# Patient Record
Sex: FEMALE | Race: WHITE | NOT HISPANIC OR LATINO | Employment: UNEMPLOYED | URBAN - METROPOLITAN AREA
[De-identification: names, ages, dates, MRNs, and addresses within clinical notes are randomized per-mention and may not be internally consistent; named-entity substitution may affect disease eponyms.]

---

## 2018-08-03 PROBLEM — M54.2 CERVICALGIA: Status: ACTIVE | Noted: 2018-08-03

## 2020-11-04 ENCOUNTER — HOSPITAL ENCOUNTER (OUTPATIENT)
Dept: LAB | Facility: MEDICAL CENTER | Age: 45
End: 2020-11-04
Attending: FAMILY MEDICINE
Payer: COMMERCIAL

## 2020-11-04 LAB — COVID ORDER STATUS COVID19: NORMAL

## 2020-11-04 PROCEDURE — C9803 HOPD COVID-19 SPEC COLLECT: HCPCS

## 2020-11-04 PROCEDURE — U0003 INFECTIOUS AGENT DETECTION BY NUCLEIC ACID (DNA OR RNA); SEVERE ACUTE RESPIRATORY SYNDROME CORONAVIRUS 2 (SARS-COV-2) (CORONAVIRUS DISEASE [COVID-19]), AMPLIFIED PROBE TECHNIQUE, MAKING USE OF HIGH THROUGHPUT TECHNOLOGIES AS DESCRIBED BY CMS-2020-01-R: HCPCS

## 2020-11-05 LAB
SARS-COV-2 RNA RESP QL NAA+PROBE: NOTDETECTED
SPECIMEN SOURCE: NORMAL

## 2021-09-06 ENCOUNTER — HOSPITAL ENCOUNTER (OUTPATIENT)
Facility: MEDICAL CENTER | Age: 46
End: 2021-09-06
Attending: NURSE PRACTITIONER
Payer: COMMERCIAL

## 2021-09-06 PROCEDURE — U0005 INFEC AGEN DETEC AMPLI PROBE: HCPCS

## 2021-09-06 PROCEDURE — U0003 INFECTIOUS AGENT DETECTION BY NUCLEIC ACID (DNA OR RNA); SEVERE ACUTE RESPIRATORY SYNDROME CORONAVIRUS 2 (SARS-COV-2) (CORONAVIRUS DISEASE [COVID-19]), AMPLIFIED PROBE TECHNIQUE, MAKING USE OF HIGH THROUGHPUT TECHNOLOGIES AS DESCRIBED BY CMS-2020-01-R: HCPCS

## 2021-09-07 LAB
COVID ORDER STATUS COVID19: NORMAL
SARS-COV-2 RNA RESP QL NAA+PROBE: NOTDETECTED
SPECIMEN SOURCE: NORMAL

## 2021-12-31 ENCOUNTER — OFFICE VISIT (OUTPATIENT)
Dept: URGENT CARE | Facility: CLINIC | Age: 46
End: 2021-12-31
Payer: COMMERCIAL

## 2021-12-31 VITALS
BODY MASS INDEX: 20.99 KG/M2 | SYSTOLIC BLOOD PRESSURE: 88 MMHG | HEIGHT: 65 IN | WEIGHT: 126 LBS | HEART RATE: 86 BPM | DIASTOLIC BLOOD PRESSURE: 58 MMHG | TEMPERATURE: 98.5 F | OXYGEN SATURATION: 96 %

## 2021-12-31 DIAGNOSIS — J34.89 SINUS PRESSURE: ICD-10-CM

## 2021-12-31 DIAGNOSIS — R09.82 POST-NASAL DRIP: ICD-10-CM

## 2021-12-31 DIAGNOSIS — H93.8X3 SENSATION OF PLUGGED EAR ON BOTH SIDES: ICD-10-CM

## 2021-12-31 PROBLEM — J30.2 SEASONAL ALLERGIES: Status: ACTIVE | Noted: 2017-10-02

## 2021-12-31 PROBLEM — K58.9 IRRITABLE BOWEL SYNDROME: Status: ACTIVE | Noted: 2017-08-09

## 2021-12-31 PROBLEM — Z78.0 MENOPAUSE: Status: ACTIVE | Noted: 2019-01-16

## 2021-12-31 PROBLEM — M54.9 BACKACHE: Status: ACTIVE | Noted: 2021-05-07

## 2021-12-31 LAB
EXTERNAL QUALITY CONTROL: NORMAL
FLUAV+FLUBV AG SPEC QL IA: NEGATIVE
INT CON NEG: NORMAL
INT CON POS: NORMAL
SARS-COV+SARS-COV-2 AG RESP QL IA.RAPID: NEGATIVE

## 2021-12-31 PROCEDURE — 87804 INFLUENZA ASSAY W/OPTIC: CPT | Performed by: NURSE PRACTITIONER

## 2021-12-31 PROCEDURE — 99203 OFFICE O/P NEW LOW 30 MIN: CPT | Mod: CS | Performed by: NURSE PRACTITIONER

## 2021-12-31 PROCEDURE — 87426 SARSCOV CORONAVIRUS AG IA: CPT | Performed by: NURSE PRACTITIONER

## 2021-12-31 RX ORDER — THYROID 120 MG/1
15 TABLET ORAL DAILY
COMMUNITY

## 2021-12-31 RX ORDER — TRIAMTERENE AND HYDROCHLOROTHIAZIDE 37.5; 25 MG/1; MG/1
TABLET ORAL
COMMUNITY

## 2021-12-31 RX ORDER — FLUVOXAMINE MALEATE 50 MG/1
TABLET, COATED ORAL
COMMUNITY

## 2021-12-31 RX ORDER — AMOXICILLIN AND CLAVULANATE POTASSIUM 875; 125 MG/1; MG/1
1 TABLET, FILM COATED ORAL 2 TIMES DAILY
Qty: 14 TABLET | Refills: 0 | Status: SHIPPED | OUTPATIENT
Start: 2021-12-31 | End: 2022-01-07

## 2021-12-31 RX ORDER — VORTIOXETINE 5 MG/1
1 TABLET, FILM COATED ORAL DAILY
COMMUNITY
End: 2022-09-21

## 2021-12-31 ASSESSMENT — ENCOUNTER SYMPTOMS: COUGH: 0

## 2021-12-31 NOTE — PROGRESS NOTES
Subjective     Brenda Marie Campagni-Milligan is a 46 y.o. female who presents with Sore Throat (x 4 days, post nasal drip, sore throat, sinus pressure, plugge ear)            Pharyngitis   This is a new problem. Episode onset: pt reports new onset of ST, sinus pressure and plugged ear sensation that started aout 4 days ago. She has had sick family contacts, all have recently tested negative for COVID. Neither side of throat is experiencing more pain than the other. Associated symptoms include congestion. Pertinent negatives include no coughing. Associated symptoms comments: She is not vaccinated for COVID. She has tried nothing for the symptoms.       Review of Systems   Constitutional: Positive for malaise/fatigue.   HENT: Positive for congestion.    Respiratory: Negative for cough.    All other systems reviewed and are negative.         History reviewed. No pertinent past medical history. History reviewed. No pertinent surgical history.   Social History     Socioeconomic History   • Marital status:      Spouse name: Not on file   • Number of children: Not on file   • Years of education: Not on file   • Highest education level: Not on file   Occupational History   • Not on file   Tobacco Use   • Smoking status: Never Smoker   • Smokeless tobacco: Never Used   Vaping Use   • Vaping Use: Never used   Substance and Sexual Activity   • Alcohol use: Not Currently   • Drug use: Never   • Sexual activity: Not on file   Other Topics Concern   • Not on file   Social History Narrative   • Not on file     Social Determinants of Health     Financial Resource Strain:    • Difficulty of Paying Living Expenses: Not on file   Food Insecurity:    • Worried About Running Out of Food in the Last Year: Not on file   • Ran Out of Food in the Last Year: Not on file   Transportation Needs:    • Lack of Transportation (Medical): Not on file   • Lack of Transportation (Non-Medical): Not on file   Physical Activity:    • Days of  "Exercise per Week: Not on file   • Minutes of Exercise per Session: Not on file   Stress:    • Feeling of Stress : Not on file   Social Connections:    • Frequency of Communication with Friends and Family: Not on file   • Frequency of Social Gatherings with Friends and Family: Not on file   • Attends Hoahaoism Services: Not on file   • Active Member of Clubs or Organizations: Not on file   • Attends Club or Organization Meetings: Not on file   • Marital Status: Not on file   Intimate Partner Violence:    • Fear of Current or Ex-Partner: Not on file   • Emotionally Abused: Not on file   • Physically Abused: Not on file   • Sexually Abused: Not on file   Housing Stability:    • Unable to Pay for Housing in the Last Year: Not on file   • Number of Places Lived in the Last Year: Not on file   • Unstable Housing in the Last Year: Not on file         Objective     BP (!) 88/58   Pulse 86   Temp 36.9 °C (98.5 °F) (Temporal)   Ht 1.638 m (5' 4.5\")   Wt 57.2 kg (126 lb)   SpO2 96%   Breastfeeding No   BMI 21.29 kg/m²      Physical Exam  Vitals and nursing note reviewed.   Constitutional:       Appearance: Normal appearance. She is normal weight.   HENT:      Head: Normocephalic and atraumatic.      Right Ear: Tympanic membrane and external ear normal.      Left Ear: Tympanic membrane and external ear normal.      Nose: Rhinorrhea present.      Mouth/Throat:      Mouth: Mucous membranes are moist.      Pharynx: Oropharynx is clear.   Eyes:      Extraocular Movements: Extraocular movements intact.      Pupils: Pupils are equal, round, and reactive to light.   Cardiovascular:      Rate and Rhythm: Normal rate and regular rhythm.   Pulmonary:      Effort: Pulmonary effort is normal.      Breath sounds: Normal breath sounds.   Musculoskeletal:         General: Normal range of motion.      Cervical back: Normal range of motion.   Skin:     General: Skin is warm and dry.      Capillary Refill: Capillary refill takes less " than 2 seconds.   Neurological:      General: No focal deficit present.      Mental Status: She is alert and oriented to person, place, and time. Mental status is at baseline.   Psychiatric:         Mood and Affect: Mood normal.         Speech: Speech normal.         Thought Content: Thought content normal.         Judgment: Judgment normal.                             Assessment & Plan        1. Post-nasal drip  - POCT Influenza A/B  - POCT SARS-COV Antigen NEIDA (Symptomatic Only)    2. Sinus pressure  - POCT Influenza A/B  - POCT SARS-COV Antigen NEIDA (Symptomatic Only)    3. Sensation of plugged ear on both sides  - POCT Influenza A/B  - POCT SARS-COV Antigen NEIDA (Symptomatic Only)       Rapid COVID and flu- negative, will not reflex to PCR  Contingent antibiotic prescription given to patient to fill upon meeting criteria of guidelines discussed.   OTC claritin and flonase daily  Increase water intake to help boost blood volume and blood pressure  I followed all reasonable PPE precautions during this encounter including but not limited to use of an N95 mask, gloves, and gown if indicated.    Supportive care, differential diagnoses, and indications for immediate follow-up discussed with patient.    Pathogenesis of diagnosis discussed including typical length and natural progression.      Instructed to return to  or nearest emergency department if symptoms fail to improve, for any change in condition, further concerns, or new concerning symptoms.  Patient states understanding of the plan of care and discharge instructions.

## 2022-08-14 ENCOUNTER — HOSPITAL ENCOUNTER (EMERGENCY)
Facility: MEDICAL CENTER | Age: 47
End: 2022-08-14
Attending: EMERGENCY MEDICINE
Payer: COMMERCIAL

## 2022-08-14 VITALS
BODY MASS INDEX: 22.58 KG/M2 | RESPIRATION RATE: 17 BRPM | HEART RATE: 79 BPM | TEMPERATURE: 97.9 F | DIASTOLIC BLOOD PRESSURE: 65 MMHG | HEIGHT: 64 IN | OXYGEN SATURATION: 99 % | SYSTOLIC BLOOD PRESSURE: 110 MMHG | WEIGHT: 132.28 LBS

## 2022-08-14 DIAGNOSIS — S46.912A STRAIN OF LEFT SHOULDER, INITIAL ENCOUNTER: ICD-10-CM

## 2022-08-14 PROCEDURE — 99282 EMERGENCY DEPT VISIT SF MDM: CPT

## 2022-08-14 RX ORDER — OXYCODONE HYDROCHLORIDE AND ACETAMINOPHEN 5; 325 MG/1; MG/1
1 TABLET ORAL EVERY 8 HOURS PRN
Qty: 10 TABLET | Refills: 0 | Status: SHIPPED | OUTPATIENT
Start: 2022-08-14 | End: 2022-08-17

## 2022-08-14 ASSESSMENT — LIFESTYLE VARIABLES
HAVE YOU EVER FELT YOU SHOULD CUT DOWN ON YOUR DRINKING: NO
DO YOU DRINK ALCOHOL: YES

## 2022-08-14 NOTE — ED PROVIDER NOTES
"ED Provider Note    CHIEF COMPLAINT  Chief Complaint   Patient presents with    Arm Pain     Left arm pain since Thursday, acutely increased yesterday. Extreme pain with trying to raise arm.  Started after doing a zip line tour.       HPI  Brenda M Campagni-Milligan is a 47 y.o. female who presents with shoulder pain.  The patient states that she went on a zip line to her on Tuesday she states the pain started on Thursday and has progressed.  She states she has severe pain to the lateral aspect of the left shoulder extending into the left trapezius region.  She states the pain is worse with abduction.  She does not have any pain in the elbow nor the shoulder.  She does not have any loss of function of the left upper extremity but does have severe pain with abduction as mentioned above.    REVIEW OF SYSTEMS  No recent fevers, no other musculoskeletal complaints    PHYSICAL EXAM  VITAL SIGNS: /61   Pulse 83   Temp 37.2 °C (99 °F) (Temporal)   Resp 18   Ht 1.626 m (5' 4\")   Wt 60 kg (132 lb 4.4 oz)   SpO2 98%   BMI 22.71 kg/m²   In general the patient appears uncomfortable but nontoxic    Extremities the patient has reproducible pain throughout the deltoid and trapezius of the left upper extremity.  She does have limited range of motion due to pain.  The majority the pain is when she approaches 45 to 90 degrees of abduction.  She has a normal left elbow and left wrist exam.    Skin no erythema nor induration    Neurovascular examination is grossly intact to the left upper extremity    COURSE & MEDICAL DECISION MAKING  Pertinent Labs & Imaging studies reviewed. (See chart for details)  This a 47-year-old female who presents with left shoulder pain.  I suspect this is from a soft tissue etiology.  The patient will be treated with Percocet for acute pain control as well as Motrin.  I will perform a narcotic check prior to prescribe the medication.  The patient will take medication only as directed.  She will " also take 800 mg of Motrin every 8 hours.  If she is not significantly better in 72 hours she will follow-up with [facility] orthopedics.    FINAL IMPRESSION  1.  Left shoulder strain       Disposition  The patient will be discharged in stable condition      Electronically signed by: Abhinav Vasquez M.D., 8/14/2022 9:25 AM

## 2022-08-14 NOTE — DISCHARGE INSTRUCTIONS
Take 800 mg of Motrin every 8 hours as discussed.  Follow-up with orthopedics if you are not significantly better in 4 to 5 days and return if you are acutely worse.

## 2022-08-14 NOTE — ED TRIAGE NOTES
"Chief Complaint   Patient presents with    Arm Pain     Left arm pain since Thursday, acutely increased yesterday. Extreme pain with trying to raise arm.  Started after doing a zip line tour.     ED Triage Vitals [08/14/22 0823]   Enc Vitals Group      Blood Pressure 109/61      Pulse 83      Respiration 18      Temperature 37.2 °C (99 °F)      Temp src Temporal      Pulse Oximetry 98 %      Weight 60 kg (132 lb 4.4 oz)      Height 1.626 m (5' 4\")     States she was holding herself up while on the zip line and thinks she may have injured her arm this was. Pain from bicep region extends to scapular region.  "

## 2022-08-15 ENCOUNTER — HOSPITAL ENCOUNTER (OUTPATIENT)
Dept: RADIOLOGY | Facility: MEDICAL CENTER | Age: 47
End: 2022-08-15
Attending: PHYSICIAN ASSISTANT
Payer: COMMERCIAL

## 2022-08-15 DIAGNOSIS — M25.512 LEFT SHOULDER PAIN, UNSPECIFIED CHRONICITY: ICD-10-CM

## 2022-08-15 PROCEDURE — 73223 MRI JOINT UPR EXTR W/O&W/DYE: CPT | Mod: LT

## 2022-08-15 PROCEDURE — A9576 INJ PROHANCE MULTIPACK: HCPCS | Performed by: PHYSICIAN ASSISTANT

## 2022-08-15 PROCEDURE — 700117 HCHG RX CONTRAST REV CODE 255: Performed by: PHYSICIAN ASSISTANT

## 2022-08-15 RX ADMIN — GADOTERIDOL 10 ML: 279.3 INJECTION, SOLUTION INTRAVENOUS at 15:10

## 2022-09-19 ENCOUNTER — TELEPHONE (OUTPATIENT)
Dept: CARDIOLOGY | Facility: MEDICAL CENTER | Age: 47
End: 2022-09-19
Payer: COMMERCIAL

## 2022-09-19 NOTE — TELEPHONE ENCOUNTER
Chart Prep  LVM for patient to CB in regards to requesting outside records for NP appt with Dr. Chairez. Primarily records pertaining to prior cardiologist, cardiac testing/imaging done outside of [facility] and when/where the patient most recently had labs. LVM to CB @[phone].

## 2022-09-20 NOTE — PROGRESS NOTES
Cardiology Initial Consultation Note    Date of note: 9/20/2022    Primary Care Provider: Carl Pathak M.D.  Referring Provider: Carl Pathak M.D.    Patient Name: Brenda M Campagni-Milligan  YOB: 1975  MRN:              [id number]    Chief Complaint   Patient presents with    Palpitations     History of Present Illness: Ms. Brenda Campagni-Milligan is a 47 y.o. female whose current medical problems include hypothyroidism, history of leukopenia, leg swelling taking Maxide who is here for cardiac consultation for palpitations.    The patient reports she has had palpitations since her 20s.  More recently in the past 8 months however, the palpitations are different and that they feel stronger, will get it may be 1 or 2 times every 2 weeks.  They last up to 4 seconds.  She also reports occasionally in the past they have lasted up to 20 seconds, sometimes just a second and coughing at times seem to make it better.  When she gets the palpitations, sometimes she feels a little lightheaded.  She does take a supplemental diet pill called XYNG and in reading the proprietary ingredients, there is caffeine, green tea, and black tea extracts and it but the actual amount of caffeine is not stated.  She has used this off and on for the past 3 years whenever she wants to lose weight and it has not caused her any issues in the past.  She is currently taking it.    Cardiovascular Risk Factors:  1. Smoking status:   Tobacco Use: Low Risk     Smoking Tobacco Use: Never    Smokeless Tobacco Use: Never     2. Type II Diabetes Mellitus: No, 5.4 (2/1/2022 outside lab)  3. Hypertension: No, on Maxide for leg edema  4. Dyslipidemia: No.  Outside lab results drawn 2/1/2022, total cholesterol 197, HDL 88, triglyceride 34, LDL 99, the only thing slightly out of range are LDL-P at 1142 (normal ranges less than 935) VLDL size 47.8 optimal is less than 47.1  5. Family history of early Coronary Artery Disease in a first  degree relative (Male less than 55 years of age; Female less than 65 years of age): No, she had an uncle who  at age 51 of a myocardial infarction, second-degree relative    Past Medical History:   Diagnosis Date    Patient denies medical problems      History reviewed. No pertinent surgical history.    Current Outpatient Medications   Medication Sig Dispense Refill    TRI-BASIL 0.01-4-0.05 % Cream APPLY TOPICALLY TO FACE EVERY NIGHT AT BEDTIME AS TOLERATED      LORazepam (ATIVAN) 0.5 MG Tab TAKE 1 TABLET BY MOUTH EVERY 4 HOURS AS NEEDED. MAX 4/24 HOUR      Omega-3 Fatty Acids (FISH OIL) 1000 MG Cap capsule Take 1,000 mg by mouth every day.      Ascorbic Acid (VITAMIN C) 1000 MG Tab Take  by mouth.      Cholecalciferol (VITAMIN D) 2000 UNIT Tab Take  by mouth every day.      Magnesium 100 MG Cap Take  by mouth every day.      fluvoxAMINE (LUVOX) 50 MG Tab fluvoxamine 50 mg tablet   2 po in AM and 1 po in PM for MC      triamterene-hctz (MAXZIDE-25/DYAZIDE) 37.5-25 MG Tab triamterene 37.5 mg-hydrochlorothiazide 25 mg tablet   TAKE 1 TABLET BY MOUTH EVERY MORNING AS DIRECTED FOR SWELLING      thyroid (ARMOUR THYROID) 120 MG Tab Take 15 mg by mouth every day. 15mg everyday       No current facility-administered medications for this visit.       Allergies   Allergen Reactions    Fentanyl Itching    Esomeprazole Nausea     stomach upset    Lansoprazole Nausea     stomach upset    Nexium Nausea    Omeprazole Nausea     Stomach upset    Vortioxetine        History reviewed. No pertinent family history.    Social History     Socioeconomic History    Marital status:    Tobacco Use    Smoking status: Never    Smokeless tobacco: Never   Vaping Use    Vaping Use: Never used   Substance and Sexual Activity    Alcohol use: Yes     Comment: once a month    Drug use: Never        Review of Systems   Constitutional: Negative for diaphoresis and fever.   Respiratory:  Negative for cough, hemoptysis and wheezing.   "  Gastrointestinal:  Negative for hematochezia and melena.   Genitourinary:  Negative for hematuria.     Physical Exam:  Ambulatory Vitals  BP (!) 98/58 (BP Location: Right arm, Patient Position: Sitting, BP Cuff Size: Adult)   Pulse 80   Resp 18   Ht 1.626 m (5' 4\")   Wt 59.8 kg (131 lb 12.8 oz)   SpO2 99%    Oxygen Therapy:  Pulse Oximetry: 99 %  BP Readings from Last 4 Encounters:   09/21/22 (!) 98/58   08/14/22 110/65   12/31/21 (!) 88/58       Weight/BMI:   Body mass index is 22.62 kg/m².  Wt Readings from Last 2 Encounters:   09/21/22 59.8 kg (131 lb 12.8 oz)   08/14/22 60 kg (132 lb 4.4 oz)       Physical Exam  Constitutional:       Appearance: Normal appearance.   Eyes:      Extraocular Movements: Extraocular movements intact.      Conjunctiva/sclera: Conjunctivae normal.   Neck:      Vascular: No carotid bruit or JVD.   Cardiovascular:      Rate and Rhythm: Normal rate and regular rhythm.      Pulses:           Radial pulses are 2+ on the right side and 2+ on the left side.        Posterior tibial pulses are 1+ on the right side and 1+ on the left side.      Heart sounds: Normal heart sounds. No murmur heard.    No friction rub. No gallop.   Pulmonary:      Effort: Pulmonary effort is normal. No respiratory distress.      Breath sounds: Normal breath sounds. No wheezing.   Abdominal:      General: Bowel sounds are normal.      Palpations: Abdomen is soft.   Musculoskeletal:      Cervical back: Neck supple.      Right lower leg: No edema.      Left lower leg: No edema.   Skin:     General: Skin is warm and dry.   Neurological:      Mental Status: She is alert and oriented to person, place, and time. Mental status is at baseline.   Psychiatric:         Mood and Affect: Mood normal.        Lab Data Review:  No results found for: SODIUM, POTASSIUM, CHLORIDE, CO2, GLUCOSE, BUN, CREATININE, BUNCREATRAT, GLOMRATE  No results found for: ALKPHOSPHAT, ASTSGOT, ALTSGPT, TBILIRUBIN   No results found for: WBC  No " results found for: TSH     Cardiac Imaging and Procedures Review:    EKG dated 9/21/2022: My personal interpretation is sinus rhythm, borderline low voltages in the precordial leads.  No prior ECG for comparison.    Radiology Review:  CXR: 9/12/2022: No acute cardiopulmonary disease normal cardiac silhouette.        Assessment & Plan     1.  Palpitations.  Discussed with her her ECG results.  They sound like they are likely premature beats and may be short runs.  Nothing sustained as she says they are less than 4 seconds usually at most 20 seconds.  Her cardiac exam is normal on present visit.  Discussed with her the supplement does have caffeine and would suggest stopping it for now.  We can go ahead and order a event monitor to document and rule out any significant or sustained arrhythmias.    She had her physician's office fax over labs she had drawn 2/1/2022.  Labs are a cardio metabolic report which is in extensive measurements of her lipids and fractionation of her lipoprotein.  Markers of inflammation was also checked which were all in the desirable range (specifically LP P TUYET 2 activity, MPO, microalbumin, they were all in the low range.  Furthermore fractionation of her lipoprotein were all in the normal range.      Shared Medical Decision Making:  All of patient's questions were answered to the best of my knowledge and to patient's satisfaction. It was a pleasure seeing Ms. Brenda Campagni-Milligan in my clinic today. Return in about 4 weeks (around 10/19/2022). Patient is aware to call the cardiology clinic with any questions or concerns.    Gita Chairez MD  [facility]  [address]  [place] [address]  Phone:  [phone]  Fax:  [phone]    Please note that this dictation was created using voice recognition software. I have made every reasonable attempt to correct obvious errors, but it is possible there are errors of grammar and possibly content  that I did not discover before finalizing the note.

## 2022-09-21 ENCOUNTER — OFFICE VISIT (OUTPATIENT)
Dept: CARDIOLOGY | Facility: MEDICAL CENTER | Age: 47
End: 2022-09-21
Payer: COMMERCIAL

## 2022-09-21 VITALS
DIASTOLIC BLOOD PRESSURE: 58 MMHG | RESPIRATION RATE: 18 BRPM | HEART RATE: 80 BPM | WEIGHT: 131.8 LBS | BODY MASS INDEX: 22.5 KG/M2 | SYSTOLIC BLOOD PRESSURE: 98 MMHG | HEIGHT: 64 IN | OXYGEN SATURATION: 99 %

## 2022-09-21 DIAGNOSIS — R00.2 PALPITATIONS: Primary | ICD-10-CM

## 2022-09-21 PROBLEM — J30.2 SEASONAL ALLERGIES: Status: RESOLVED | Noted: 2017-10-02 | Resolved: 2022-09-21

## 2022-09-21 LAB — EKG IMPRESSION: NORMAL

## 2022-09-21 PROCEDURE — 99204 OFFICE O/P NEW MOD 45 MIN: CPT | Performed by: INTERNAL MEDICINE

## 2022-09-21 PROCEDURE — 93000 ELECTROCARDIOGRAM COMPLETE: CPT | Performed by: INTERNAL MEDICINE

## 2022-09-21 RX ORDER — CHOLECALCIFEROL (VITAMIN D3) 50 MCG
TABLET ORAL DAILY
COMMUNITY

## 2022-09-21 RX ORDER — LORAZEPAM 0.5 MG/1
TABLET ORAL
COMMUNITY
Start: 2022-08-31

## 2022-09-21 RX ORDER — MULTIVIT WITH MINERALS/LUTEIN
TABLET ORAL
COMMUNITY

## 2022-09-21 RX ORDER — CHLORAL HYDRATE 500 MG
1000 CAPSULE ORAL DAILY
COMMUNITY

## 2022-09-21 RX ORDER — FLUOCINOLONE ACETONIDE, HYDROQUINONE, AND TRETINOIN .1; 40; .5 MG/G; MG/G; MG/G
CREAM TOPICAL
COMMUNITY
Start: 2022-08-25

## 2022-09-21 RX ORDER — TIZANIDINE 4 MG/1
TABLET ORAL
COMMUNITY
Start: 2022-08-01 | End: 2022-09-21

## 2022-09-21 RX ORDER — HYDROXYZINE HYDROCHLORIDE 25 MG/1
TABLET, FILM COATED ORAL
COMMUNITY
Start: 2022-08-01 | End: 2022-09-21

## 2022-09-21 RX ORDER — FAMOTIDINE 20 MG/1
20 TABLET, FILM COATED ORAL 2 TIMES DAILY
COMMUNITY
Start: 2022-08-01 | End: 2022-09-21

## 2022-09-21 RX ORDER — SUCRALFATE 1 G/1
TABLET ORAL
COMMUNITY
Start: 2022-07-28 | End: 2022-09-21

## 2022-09-21 ASSESSMENT — ENCOUNTER SYMPTOMS
WHEEZING: 0
FEVER: 0
HEMOPTYSIS: 0
HEMATOCHEZIA: 0
DIAPHORESIS: 0
COUGH: 0

## 2022-09-22 ENCOUNTER — NON-PROVIDER VISIT (OUTPATIENT)
Dept: CARDIOLOGY | Facility: MEDICAL CENTER | Age: 47
End: 2022-09-22
Attending: INTERNAL MEDICINE
Payer: COMMERCIAL

## 2022-09-22 DIAGNOSIS — R00.2 PALPITATIONS: ICD-10-CM

## 2022-09-22 PROCEDURE — 99999 PR NO CHARGE: CPT | Performed by: INTERNAL MEDICINE

## 2022-09-22 NOTE — PROGRESS NOTES
Closing encounter, no response from pt. No monitor sent back to [facility] 12/14/22      S/W pt states she is going to place her monitor tomorrow on 11/10/2022      Home enrollment completed in the 14 day [employer] heart monitoring program, per Gita Chairez m.D.  Monitor will be shipped to patient via [employer].  >Pending Basline.  >Pending EOS.

## 2022-12-19 ENCOUNTER — TELEPHONE (OUTPATIENT)
Dept: CARDIOLOGY | Facility: MEDICAL CENTER | Age: 47
End: 2022-12-19

## 2022-12-19 ENCOUNTER — NON-PROVIDER VISIT (OUTPATIENT)
Dept: CARDIOLOGY | Facility: MEDICAL CENTER | Age: 47
End: 2022-12-19
Payer: COMMERCIAL

## 2022-12-19 DIAGNOSIS — R00.0 SINUS TACHYCARDIA: ICD-10-CM

## 2022-12-19 DIAGNOSIS — I49.1 APC (ATRIAL PREMATURE CONTRACTIONS): ICD-10-CM

## 2022-12-19 DIAGNOSIS — I49.3 PVC'S (PREMATURE VENTRICULAR CONTRACTIONS): ICD-10-CM

## 2022-12-19 NOTE — PROGRESS NOTES
Home enrollment completed in the 30 day [facility] heart monitoring program, per Gita Chairez.  Monitor will be shipped to patient via [employer].  >Pending baseline.  >Pending EOS.

## 2022-12-19 NOTE — TELEPHONE ENCOUNTER
Caller: Brenda M Campagni-Milligan    Topic/issue: Patient is calling to request a new order for a heart rate monitor. She states she was not able to complete the last ordered monitor but will be able to this time around. Patient also states that she has the original monitor from the first order and would like to know if she can start the process over with this same monitor. Please advise.     Callback Number: [phone] (home)     Thank you,   Tyesha WICK

## 2023-01-16 ENCOUNTER — TELEPHONE (OUTPATIENT)
Dept: CARDIOLOGY | Facility: MEDICAL CENTER | Age: 48
End: 2023-01-16
Payer: COMMERCIAL

## 2023-01-16 NOTE — TELEPHONE ENCOUNTER
Caller: Brenda M Campagni-Milligan    Topic/issue: DEVICE    Patient states that she has a heart monitor that she has been wear for 2 weeks. She states that she is covered in a rash and she would like to know if 2 weeks is enough data or if she needs to wear the monitor for a longer time period. Please advise.    Thank you,  Panfilo KAUFFMAN    Callback Number: [phone] (home)

## 2023-01-20 ENCOUNTER — TELEPHONE (OUTPATIENT)
Dept: CARDIOLOGY | Facility: MEDICAL CENTER | Age: 48
End: 2023-01-20
Payer: COMMERCIAL

## 2023-01-20 NOTE — TELEPHONE ENCOUNTER
CH    Patient is calling as she had an event at about 10:00 am today. She is wearing the heart monitor and wants to know if you can pull the readings from that time frame and let her know what the event was. She stated she felt palpitations. Please call her at [phone].    Thank you,  Analilia VANCE

## 2023-01-21 NOTE — TELEPHONE ENCOUNTER
Tried to call pt back to let her know transmissions reviewed by Dr. Chairez, nothing worrisome. No answer, unable to leave msg.

## 2023-01-21 NOTE — TELEPHONE ENCOUNTER
S/W pt, she has had HM for about 3 weeks, has had several instances of palpitations, last one today lasting about 20 seconds, says they are no different than what was reported to Dr. Chairez.   She is sending reported symptoms as they come, no transmissions sent to our office with anything urgent, pt will reach out to them to see if they can give feedback to her about today's episode, pt aware note will be sent to provider and we discussed ER precautions for urgent symptoms.  Pt ok to wait for feedback from HM report when complete and will reach out with any questions in the meantime.      Tara Gonzalez  You 18 minutes ago (4:03 PM)     DS  They have not released daily transmissions for that date, last one I have is from the 18th.

## 2023-01-23 NOTE — TELEPHONE ENCOUNTER
Patient is calling Hudson back. She is off today, sending to covering RN. Patient would also like to know if she can remove devise as it is irritating her skin. Please call her at [phone].    Thank you,  Analilia VANCE

## 2023-01-24 NOTE — TELEPHONE ENCOUNTER
Beau Cuenac,     It is fine. I just logged onto [employer] and looked.     The last tracings were 1/17/23 13:53 she reported lightheaded and showed sinus rhythm, 139 bpm and at 15:14 sinus, 86 bpm.  Nothing worrisome.     Gita Thomson            Phone Number Called: [phone]    Call outcome: Spoke to patient regarding message below.    Message: Called to discuss CH results and recommendations. Answered all questions and concerns, advised if the patches are irritating skin she can send it back and we will await final report and recommendations from CH.

## 2023-01-30 ENCOUNTER — TELEPHONE (OUTPATIENT)
Dept: CARDIOLOGY | Facility: MEDICAL CENTER | Age: 48
End: 2023-01-30
Payer: COMMERCIAL

## 2023-01-30 PROCEDURE — 93268 ECG RECORD/REVIEW: CPT | Performed by: INTERNAL MEDICINE

## 2023-02-03 ENCOUNTER — TELEPHONE (OUTPATIENT)
Dept: CARDIOLOGY | Facility: MEDICAL CENTER | Age: 48
End: 2023-02-03
Payer: COMMERCIAL

## 2023-02-03 NOTE — TELEPHONE ENCOUNTER
Tried to call pt, looks like she is asking for feedback from final heart monitor result. No answer, left msg nothing urgent found other than what we previously discussed when recently reviewed by Dr. Chairez while she was wearing the monitor--see 1/20 tele note. Will ask for provider feedback from AB on final result for pt.

## 2023-02-03 NOTE — TELEPHONE ENCOUNTER
Caller: Marnie    Topic/issue: Pt called and wanted to speak to a nurse about her Zio patch results.    Callback Number: [phone]

## 2023-02-06 NOTE — TELEPHONE ENCOUNTER
Please let patient know that heart monitor showed sinus rhythm with appropriate heart ranges.  PAC/PVC burden <1% (which is good)  No AFib or pauses or AV blocks.    She could try OTC Magnesium oxide 400mg once daily to see if this helps  OR  If she wants to try Toprol XL 25mg PRN palpitations, that's OK too.    Thanks, AB

## 2023-02-07 NOTE — TELEPHONE ENCOUNTER
Tried to call pt to provide feedback from provider, no answer. Left msg asking pt to call back with any questions.

## 2023-02-14 ENCOUNTER — TELEPHONE (OUTPATIENT)
Dept: CARDIOLOGY | Facility: MEDICAL CENTER | Age: 48
End: 2023-02-14

## 2023-02-14 ENCOUNTER — TELEMEDICINE (OUTPATIENT)
Dept: CARDIOLOGY | Facility: MEDICAL CENTER | Age: 48
End: 2023-02-14
Payer: COMMERCIAL

## 2023-02-14 VITALS
HEIGHT: 64 IN | BODY MASS INDEX: 21.68 KG/M2 | HEART RATE: 72 BPM | DIASTOLIC BLOOD PRESSURE: 58 MMHG | SYSTOLIC BLOOD PRESSURE: 104 MMHG | WEIGHT: 127 LBS

## 2023-02-14 DIAGNOSIS — R00.2 PALPITATIONS: ICD-10-CM

## 2023-02-14 PROCEDURE — 99213 OFFICE O/P EST LOW 20 MIN: CPT | Mod: 95 | Performed by: INTERNAL MEDICINE

## 2023-02-14 NOTE — TELEPHONE ENCOUNTER
"    Caller: Marnie    Topic/issue: Marnie has changed her 1\"00 appt to a Virtual - [facility]    Callback Number: [phone]    Thank you,   Suze WEISS"

## 2023-02-14 NOTE — PROGRESS NOTES
Virtual Visit: Established Patient   This visit was conducted via Zoom using secure and encrypted videoconferencing technology.   The patient was in their home in the [facility].    The patient's identity was confirmed and verbal consent was obtained for this virtual visit.     Subjective:   CC:   Chief Complaint   Patient presents with    Palpitations     Brenda M Campagni-Milligan is a 47 y.o. female presenting for evaluation and management of: Palpitations.  The patient is following up on her event monitor.  She does report that she had an episode that was pretty severe on the [employer] where she felt like her heart was flipping over and she might have blacked out but did not.  She has never had any syncopal event.  She did activate the button and it showed sinus tachycardia rates up into the 140s.    Does report now she is having some chest tightness or pressure which seems to be external.  Its not brought on with exercise, can be anytime lasting a few minutes.  She feels like if she puts pressure on her chest feels much better.  She is wondering if this could be from blocked heart artery.    ROS   Negative    Current medicines (including changes today)  Current Outpatient Medications   Medication Sig Dispense Refill    TRI-BASIL 0.01-4-0.05 % Cream APPLY TOPICALLY TO FACE EVERY NIGHT AT BEDTIME AS TOLERATED      LORazepam (ATIVAN) 0.5 MG Tab TAKE 1 TABLET BY MOUTH EVERY 4 HOURS AS NEEDED. MAX 4/24 HOUR      Omega-3 Fatty Acids (FISH OIL) 1000 MG Cap capsule Take 1,000 mg by mouth every day.      Ascorbic Acid (VITAMIN C) 1000 MG Tab Take  by mouth.      Cholecalciferol (VITAMIN D) 2000 UNIT Tab Take  by mouth every day.      Magnesium 100 MG Cap Take  by mouth every day.      fluvoxAMINE (LUVOX) 50 MG Tab fluvoxamine 50 mg tablet   2 po in AM and 1 po in PM for MC      triamterene-hctz (MAXZIDE-25/DYAZIDE) 37.5-25 MG Tab triamterene 37.5 mg-hydrochlorothiazide 25 mg tablet   TAKE 1 TABLET BY MOUTH EVERY  "MORNING AS DIRECTED FOR SWELLING      thyroid (ARMOUR THYROID) 120 MG Tab Take 15 mg by mouth every day. 15mg everyday       No current facility-administered medications for this visit.       Patient Active Problem List    Diagnosis Date Noted    Backache 05/07/2021    Menopause 01/16/2019    Cervicalgia 08/03/2018    Irritable bowel syndrome 08/09/2017    Angiomyolipoma of kidney 12/11/2015    Hemangioma 12/11/2015    Generalized anxiety disorder 01/03/2015    Carpal tunnel syndrome 07/24/2014    Leukopenia 03/01/2010    History of gastritis 05/05/2009    Hypothyroidism 10/02/2005      Objective:   /58 (BP Location: Left arm, Patient Position: Sitting, BP Cuff Size: Adult)   Pulse 72   Ht 1.626 m (5' 4\")   Wt 57.6 kg (127 lb)   BMI 21.80 kg/m²     Physical Exam:  Constitutional: Alert, no distress, well-groomed.  Skin: No rashes in visible areas.  Eye: Round. Conjunctiva clear, lids normal. No icterus.   ENMT: Lips pink without lesions, good dentition, moist mucous membranes. Phonation normal.  Neck: No masses, no thyromegaly. Moves freely without pain.  Respiratory: Unlabored respiratory effort, no cough or audible wheeze  Psych: Alert and oriented x3, normal affect and mood.      CARDIAC RHYTHM MONITOR: BIOTEL: 30 days, ending 1/28/2023   1. Sinus rhythm; average heart rate 78 beats per minute, range  bpm.   2. Rare premature atrial complexes, < 1% occurrence.   3. Rare premature ventricular complexes, < 1% occurrence.   4. No atrial fibrillation, significant pauses, heart block or sustained arrhythmias.   5. 22 symptomatic and/or patient triggered events \"skipped heartbeat and lightheaded\"; 2 episodes with sinus rhythm and either a single PAC or PVC, 12 episodes with sinus rhythm and normal hearts between 72 and 94 bpm and 8 episodes associated with sinus tachycardia between 104 and 143 bpm       Assessment and Plan:   The following treatment plan was discussed:     1.  Symptoms of heart flipping " over and feeling like she is going to blackout is not secondary to an arrhythmia issue.  I went over her event monitor with her.  It was no significant arrhythmias noted.  Most of her symptoms correspond to sinus rhythm or sinus tachycardia.  Discussed a pulse of 140s if she is exercising can be normal.  Blood pressure is typically low and she takes Maxide for lower extremity edema.  It is possible this is more of a blood pressure issue.  Discussed with her to talk with her primary care provider about maybe switching her over to Lasix instead to keep fluid off without lowering her blood pressure too much.  -She needs to hydrate well and take electrolytes  - Consider changing her Maxide over to Lasix for lower extremity edema so it does not lower her blood pressure as much    2.  Chest discomfort that she is now reporting.  It is not brought on with exertion.  Sounds atypical for obstructive coronary artery disease.  She also does not have significant coronary risk factors.  If she continues to have the symptoms and more so with exertion, when she exercises, she will call clinic to further evaluate.    Follow-up: No follow-ups on file.

## 2023-03-31 ENCOUNTER — HOSPITAL ENCOUNTER (OUTPATIENT)
Dept: LAB | Facility: MEDICAL CENTER | Age: 48
End: 2023-03-31
Attending: NURSE PRACTITIONER
Payer: COMMERCIAL

## 2023-03-31 LAB
T3FREE SERPL-MCNC: 2.72 PG/ML (ref 2–4.4)
T4 FREE SERPL-MCNC: 1.21 NG/DL (ref 0.93–1.7)

## 2023-03-31 PROCEDURE — 84403 ASSAY OF TOTAL TESTOSTERONE: CPT

## 2023-03-31 PROCEDURE — 84439 ASSAY OF FREE THYROXINE: CPT

## 2023-03-31 PROCEDURE — 84402 ASSAY OF FREE TESTOSTERONE: CPT

## 2023-03-31 PROCEDURE — 36415 COLL VENOUS BLD VENIPUNCTURE: CPT

## 2023-03-31 PROCEDURE — 83002 ASSAY OF GONADOTROPIN (LH): CPT

## 2023-03-31 PROCEDURE — 83001 ASSAY OF GONADOTROPIN (FSH): CPT

## 2023-03-31 PROCEDURE — 82670 ASSAY OF TOTAL ESTRADIOL: CPT

## 2023-03-31 PROCEDURE — 84481 FREE ASSAY (FT-3): CPT

## 2023-03-31 PROCEDURE — 84144 ASSAY OF PROGESTERONE: CPT

## 2023-03-31 PROCEDURE — 84305 ASSAY OF SOMATOMEDIN: CPT

## 2023-03-31 PROCEDURE — 84270 ASSAY OF SEX HORMONE GLOBUL: CPT

## 2023-04-01 LAB
ESTRADIOL SERPL-MCNC: 116 PG/ML
FSH SERPL-ACNC: 38.9 MIU/ML
LH SERPL-ACNC: 22.6 IU/L
PROGEST SERPL-MCNC: 1.35 NG/ML

## 2023-04-03 LAB
IGF-I SERPL-MCNC: 160 NG/ML (ref 59–238)
IGF-I Z-SCORE SERPL: 0.6

## 2023-04-06 LAB
SHBG SERPL-SCNC: 112 NMOL/L (ref 25–122)
TESTOST FREE SERPL-MCNC: 19.7 PG/ML (ref 1.1–5.8)
TESTOST SERPL-MCNC: 261 NG/DL (ref 9–55)

## 2023-06-12 ENCOUNTER — HOSPITAL ENCOUNTER (OUTPATIENT)
Dept: LAB | Facility: MEDICAL CENTER | Age: 48
End: 2023-06-12
Attending: NURSE PRACTITIONER
Payer: COMMERCIAL

## 2023-06-13 ENCOUNTER — HOSPITAL ENCOUNTER (OUTPATIENT)
Dept: LAB | Facility: MEDICAL CENTER | Age: 48
End: 2023-06-13
Attending: NURSE PRACTITIONER
Payer: COMMERCIAL

## 2023-06-13 LAB
ESTRADIOL SERPL-MCNC: 8.9 PG/ML
FSH SERPL-ACNC: 53.7 MIU/ML
LH SERPL-ACNC: 42.1 IU/L
PROGEST SERPL-MCNC: 0.49 NG/ML
T3FREE SERPL-MCNC: 2.7 PG/ML (ref 2–4.4)
T4 FREE SERPL-MCNC: 1.37 NG/DL (ref 0.93–1.7)
T4 SERPL-MCNC: 8.4 UG/DL (ref 4–12)
TSH SERPL DL<=0.005 MIU/L-ACNC: 0.94 UIU/ML (ref 0.38–5.33)

## 2023-06-13 PROCEDURE — 84403 ASSAY OF TOTAL TESTOSTERONE: CPT

## 2023-06-13 PROCEDURE — 83002 ASSAY OF GONADOTROPIN (LH): CPT

## 2023-06-13 PROCEDURE — 36415 COLL VENOUS BLD VENIPUNCTURE: CPT

## 2023-06-13 PROCEDURE — 84481 FREE ASSAY (FT-3): CPT

## 2023-06-13 PROCEDURE — 84270 ASSAY OF SEX HORMONE GLOBUL: CPT

## 2023-06-13 PROCEDURE — 83001 ASSAY OF GONADOTROPIN (FSH): CPT

## 2023-06-13 PROCEDURE — 84439 ASSAY OF FREE THYROXINE: CPT

## 2023-06-13 PROCEDURE — 84402 ASSAY OF FREE TESTOSTERONE: CPT

## 2023-06-13 PROCEDURE — 84443 ASSAY THYROID STIM HORMONE: CPT

## 2023-06-13 PROCEDURE — 86800 THYROGLOBULIN ANTIBODY: CPT

## 2023-06-13 PROCEDURE — 84144 ASSAY OF PROGESTERONE: CPT

## 2023-06-13 PROCEDURE — 82670 ASSAY OF TOTAL ESTRADIOL: CPT

## 2023-06-15 LAB — THYROGLOB AB SERPL-ACNC: <0.9 IU/ML (ref 0–4)

## 2023-06-18 LAB
SHBG SERPL-SCNC: 117 NMOL/L (ref 25–122)
TESTOST FREE SERPL-MCNC: 0.9 PG/ML (ref 1.1–5.8)
TESTOST SERPL-MCNC: 13 NG/DL (ref 9–55)

## 2023-07-27 ENCOUNTER — TELEPHONE (OUTPATIENT)
Dept: CARDIOLOGY | Facility: MEDICAL CENTER | Age: 48
End: 2023-07-27
Payer: COMMERCIAL

## 2023-07-27 DIAGNOSIS — R00.0 SINUS TACHYCARDIA: ICD-10-CM

## 2023-07-27 DIAGNOSIS — I49.3 PVC'S (PREMATURE VENTRICULAR CONTRACTIONS): ICD-10-CM

## 2023-07-27 DIAGNOSIS — R00.2 PALPITATIONS: ICD-10-CM

## 2023-07-27 DIAGNOSIS — I49.1 APC (ATRIAL PREMATURE CONTRACTIONS): ICD-10-CM

## 2023-07-27 NOTE — TELEPHONE ENCOUNTER
"To AB as member of care team,  out of office.    S/W pt, she states she has had several episodes of palpitations over the last 3 days. She did have some \"half-caf\" coffee 2 days ago and usually does not have caffeine. She is under a lot of stress at home from being out of town and home renovations. She also notes that she is not hydrating well with water daily and not eating regularly. She has gluten allergy and follows low carb diet usually. She denies alcohol and/or nicotine use. Denies SOB and chest pain/discomfort.  We discussed hydrating well with 6-8 glasses of water daily, eat regular healthy meals, relaxation techniques for stress.     Reviewed chart, last OV with  notes could be BP related, pt advised to talk to PCP about switching diuretic to lasix. Pt says her BP has always been low, she does not feel this is BP related, did not talk to PCP about switching. She did let them know about increased palpitations again and they asked her to reach out to us.     Pt would like feedback from provider, she says BB was discussed with Dr. Chairez if they continue. Her BP was low at last visit, pulse was 72. \"BP always low\". Any other feedback other than what we discussed? Pt had heart monitor--no concerning findings.  "

## 2023-07-27 NOTE — TELEPHONE ENCOUNTER
Caller: Brenda Marie Campagni Milligan      Topic/issue: Patient has been experiencing palps the last few days. More then she thought. The day before yesterday she had 17 and she's had quite a few the past few days as well. She was concerned and she was asking for a call back      Callback Number: [phone]        Thank you    -Avelino MCQUEEN

## 2023-07-28 DIAGNOSIS — I49.1 APC (ATRIAL PREMATURE CONTRACTIONS): ICD-10-CM

## 2023-07-28 DIAGNOSIS — R00.2 PALPITATIONS: ICD-10-CM

## 2023-07-28 DIAGNOSIS — I49.3 PVC'S (PREMATURE VENTRICULAR CONTRACTIONS): ICD-10-CM

## 2023-07-28 DIAGNOSIS — R00.0 SINUS TACHYCARDIA: ICD-10-CM

## 2023-07-28 RX ORDER — METOPROLOL SUCCINATE 25 MG/1
25 TABLET, EXTENDED RELEASE ORAL
Qty: 30 TABLET | Refills: 0 | Status: SHIPPED | OUTPATIENT
Start: 2023-07-28

## 2023-07-28 NOTE — TELEPHONE ENCOUNTER
Could add low dose Toprol XL 25mg PRN.    Would suggest starting with 12.5mg (1/2 tablet), and can increase to 25mg if BP tolerates it.    She can use this AS NEEDED for sustained palpitations.    OK to call in #[phone].    Thanks, AB

## 2023-08-03 RX ORDER — METOPROLOL SUCCINATE 25 MG/1
TABLET, EXTENDED RELEASE ORAL
Qty: 90 TABLET | Refills: 0 | OUTPATIENT
Start: 2023-08-03

## 2023-09-01 ENCOUNTER — HOSPITAL ENCOUNTER (OUTPATIENT)
Dept: LAB | Facility: MEDICAL CENTER | Age: 48
End: 2023-09-01
Attending: NURSE PRACTITIONER
Payer: COMMERCIAL

## 2023-09-01 ENCOUNTER — HOSPITAL ENCOUNTER (OUTPATIENT)
Dept: LAB | Facility: MEDICAL CENTER | Age: 48
End: 2023-09-01
Attending: FAMILY MEDICINE
Payer: COMMERCIAL

## 2023-09-01 LAB
ESTRADIOL SERPL-MCNC: 41.7 PG/ML
FERRITIN SERPL-MCNC: 75.1 NG/ML (ref 10–291)
PROGEST SERPL-MCNC: 0.72 NG/ML
T4 FREE SERPL-MCNC: 1.13 NG/DL (ref 0.93–1.7)
TSH SERPL DL<=0.005 MIU/L-ACNC: 1.1 UIU/ML (ref 0.38–5.33)
VIT B12 SERPL-MCNC: 1088 PG/ML (ref 211–911)

## 2023-09-01 PROCEDURE — 84402 ASSAY OF FREE TESTOSTERONE: CPT

## 2023-09-01 PROCEDURE — 83540 ASSAY OF IRON: CPT

## 2023-09-01 PROCEDURE — 83550 IRON BINDING TEST: CPT

## 2023-09-01 PROCEDURE — 84439 ASSAY OF FREE THYROXINE: CPT

## 2023-09-01 PROCEDURE — 82728 ASSAY OF FERRITIN: CPT

## 2023-09-01 PROCEDURE — 36415 COLL VENOUS BLD VENIPUNCTURE: CPT

## 2023-09-01 PROCEDURE — 82607 VITAMIN B-12: CPT

## 2023-09-01 PROCEDURE — 84443 ASSAY THYROID STIM HORMONE: CPT

## 2023-09-01 PROCEDURE — 84466 ASSAY OF TRANSFERRIN: CPT

## 2023-09-01 PROCEDURE — 84144 ASSAY OF PROGESTERONE: CPT

## 2023-09-01 PROCEDURE — 82670 ASSAY OF TOTAL ESTRADIOL: CPT

## 2023-09-01 PROCEDURE — 84403 ASSAY OF TOTAL TESTOSTERONE: CPT

## 2023-09-01 PROCEDURE — 83090 ASSAY OF HOMOCYSTEINE: CPT

## 2023-09-01 PROCEDURE — 84270 ASSAY OF SEX HORMONE GLOBUL: CPT

## 2023-09-02 LAB
HCYS SERPL-SCNC: 7.58 UMOL/L
IRON SATN MFR SERPL: 24 % (ref 15–55)
IRON SERPL-MCNC: 65 UG/DL (ref 40–170)
TIBC SERPL-MCNC: 267 UG/DL (ref 250–450)
TRANSFERRIN SERPL-MCNC: 227 MG/DL (ref 200–370)
UIBC SERPL-MCNC: 202 UG/DL (ref 110–370)

## 2023-09-07 LAB
SHBG SERPL-SCNC: 119 NMOL/L (ref 25–122)
TESTOST FREE SERPL-MCNC: 2.7 PG/ML (ref 1.1–5.8)
TESTOST SERPL-MCNC: 39 NG/DL (ref 9–55)

## 2024-01-18 ENCOUNTER — HOSPITAL ENCOUNTER (OUTPATIENT)
Dept: LAB | Facility: MEDICAL CENTER | Age: 49
End: 2024-01-18
Attending: FAMILY MEDICINE
Payer: COMMERCIAL

## 2024-01-18 ENCOUNTER — HOSPITAL ENCOUNTER (OUTPATIENT)
Dept: LAB | Facility: MEDICAL CENTER | Age: 49
End: 2024-01-18
Attending: NURSE PRACTITIONER
Payer: COMMERCIAL

## 2024-01-18 LAB
T3FREE SERPL-MCNC: 2.5 PG/ML (ref 2–4.4)
T4 FREE SERPL-MCNC: 1.31 NG/DL (ref 0.93–1.7)
THYROPEROXIDASE AB SERPL-ACNC: <9 IU/ML (ref 0–9)
TSH SERPL DL<=0.005 MIU/L-ACNC: 0.59 UIU/ML (ref 0.38–5.33)

## 2024-01-18 PROCEDURE — 82670 ASSAY OF TOTAL ESTRADIOL: CPT

## 2024-01-18 PROCEDURE — 84481 FREE ASSAY (FT-3): CPT

## 2024-01-18 PROCEDURE — 84144 ASSAY OF PROGESTERONE: CPT

## 2024-01-18 PROCEDURE — 84482 T3 REVERSE: CPT

## 2024-01-18 PROCEDURE — 82150 ASSAY OF AMYLASE: CPT

## 2024-01-18 PROCEDURE — 84270 ASSAY OF SEX HORMONE GLOBUL: CPT

## 2024-01-18 PROCEDURE — 84403 ASSAY OF TOTAL TESTOSTERONE: CPT

## 2024-01-18 PROCEDURE — 83001 ASSAY OF GONADOTROPIN (FSH): CPT

## 2024-01-18 PROCEDURE — 86376 MICROSOMAL ANTIBODY EACH: CPT

## 2024-01-18 PROCEDURE — 83690 ASSAY OF LIPASE: CPT

## 2024-01-18 PROCEDURE — 84443 ASSAY THYROID STIM HORMONE: CPT

## 2024-01-18 PROCEDURE — 84439 ASSAY OF FREE THYROXINE: CPT

## 2024-01-18 PROCEDURE — 84402 ASSAY OF FREE TESTOSTERONE: CPT

## 2024-01-18 PROCEDURE — 36415 COLL VENOUS BLD VENIPUNCTURE: CPT

## 2024-01-18 PROCEDURE — 83002 ASSAY OF GONADOTROPIN (LH): CPT

## 2024-01-18 PROCEDURE — 83036 HEMOGLOBIN GLYCOSYLATED A1C: CPT

## 2024-01-18 PROCEDURE — 84305 ASSAY OF SOMATOMEDIN: CPT

## 2024-01-19 ENCOUNTER — HOSPITAL ENCOUNTER (OUTPATIENT)
Facility: MEDICAL CENTER | Age: 49
End: 2024-01-19
Attending: FAMILY MEDICINE
Payer: COMMERCIAL

## 2024-01-19 LAB
AMYLASE SERPL-CCNC: 77 U/L (ref 20–103)
EST. AVERAGE GLUCOSE BLD GHB EST-MCNC: 117 MG/DL
ESTRADIOL SERPL-MCNC: 35.1 PG/ML
FSH SERPL-ACNC: 43.8 MIU/ML
H PYLORI AG STL QL IA: NOT DETECTED
HBA1C MFR BLD: 5.7 % (ref 4–5.6)
LH SERPL-ACNC: 32.5 IU/L
LIPASE SERPL-CCNC: 23 U/L (ref 11–82)
PROGEST SERPL-MCNC: 0.96 NG/ML

## 2024-01-19 PROCEDURE — 87338 HPYLORI STOOL AG IA: CPT

## 2024-01-20 LAB
IGF-I SERPL-MCNC: 150 NG/ML (ref 59–238)
IGF-I Z-SCORE SERPL: 0.5

## 2024-01-21 LAB — T3REVERSE SERPL-MCNC: 16.9 NG/DL (ref 9–27)

## 2024-01-24 LAB
SHBG SERPL-SCNC: 130 NMOL/L (ref 25–122)
TESTOST FREE SERPL-MCNC: ABNORMAL PG/ML (ref 1.1–5.8)
TESTOST SERPL-MCNC: ABNORMAL NG/DL (ref 9–55)

## 2024-04-05 ENCOUNTER — HOSPITAL ENCOUNTER (OUTPATIENT)
Dept: LAB | Facility: MEDICAL CENTER | Age: 49
End: 2024-04-05
Attending: PHYSICAL MEDICINE & REHABILITATION
Payer: COMMERCIAL

## 2024-04-05 LAB
FOLATE SERPL-MCNC: 14.8 NG/ML
T3FREE SERPL-MCNC: 2.38 PG/ML (ref 2–4.4)
T4 FREE SERPL-MCNC: 1.26 NG/DL (ref 0.93–1.7)
TSH SERPL DL<=0.005 MIU/L-ACNC: 1.07 UIU/ML (ref 0.38–5.33)

## 2024-04-05 PROCEDURE — 84255 ASSAY OF SELENIUM: CPT

## 2024-04-05 PROCEDURE — 82746 ASSAY OF FOLIC ACID SERUM: CPT

## 2024-04-05 PROCEDURE — 84443 ASSAY THYROID STIM HORMONE: CPT

## 2024-04-05 PROCEDURE — 36415 COLL VENOUS BLD VENIPUNCTURE: CPT

## 2024-04-05 PROCEDURE — 84482 T3 REVERSE: CPT

## 2024-04-05 PROCEDURE — 84630 ASSAY OF ZINC: CPT

## 2024-04-05 PROCEDURE — 84481 FREE ASSAY (FT-3): CPT

## 2024-04-05 PROCEDURE — 84439 ASSAY OF FREE THYROXINE: CPT

## 2024-04-05 PROCEDURE — 83735 ASSAY OF MAGNESIUM: CPT

## 2024-04-05 PROCEDURE — 84590 ASSAY OF VITAMIN A: CPT

## 2024-04-08 LAB
MAGNESIUM RBC-SCNC: 5.4 MG/DL (ref 3.6–7.5)
SELENIUM SERPL-MCNC: 118.5 UG/L (ref 23–190)
ZINC BLD-MCNC: 687.6 UG/DL (ref 440–860)

## 2024-04-09 LAB
ANNOTATION COMMENT IMP: NORMAL
RETINYL PALMITATE SERPL-MCNC: 0.02 MG/L (ref 0–0.1)
VIT A SERPL-MCNC: 0.59 MG/L (ref 0.3–1.2)

## 2024-04-10 LAB — T3REVERSE SERPL-MCNC: 15.7 NG/DL (ref 9–27)

## 2024-05-23 ENCOUNTER — OFFICE VISIT (OUTPATIENT)
Dept: URGENT CARE | Facility: CLINIC | Age: 49
End: 2024-05-23
Payer: COMMERCIAL

## 2024-05-23 VITALS
RESPIRATION RATE: 14 BRPM | HEART RATE: 94 BPM | BODY MASS INDEX: 21.33 KG/M2 | OXYGEN SATURATION: 98 % | HEIGHT: 65 IN | WEIGHT: 128 LBS | DIASTOLIC BLOOD PRESSURE: 58 MMHG | TEMPERATURE: 97.8 F | SYSTOLIC BLOOD PRESSURE: 100 MMHG

## 2024-05-23 DIAGNOSIS — R09.82 PND (POST-NASAL DRIP): ICD-10-CM

## 2024-05-23 DIAGNOSIS — J98.8 RTI (RESPIRATORY TRACT INFECTION): ICD-10-CM

## 2024-05-23 DIAGNOSIS — J02.8 PHARYNGITIS DUE TO OTHER ORGANISM: ICD-10-CM

## 2024-05-23 LAB
FLUAV RNA SPEC QL NAA+PROBE: NEGATIVE
FLUBV RNA SPEC QL NAA+PROBE: NEGATIVE
RSV RNA SPEC QL NAA+PROBE: NEGATIVE
S PYO DNA SPEC NAA+PROBE: NOT DETECTED
SARS-COV-2 RNA RESP QL NAA+PROBE: NEGATIVE

## 2024-05-23 PROCEDURE — 3078F DIAST BP <80 MM HG: CPT | Performed by: FAMILY MEDICINE

## 2024-05-23 PROCEDURE — 99213 OFFICE O/P EST LOW 20 MIN: CPT | Performed by: FAMILY MEDICINE

## 2024-05-23 PROCEDURE — 3074F SYST BP LT 130 MM HG: CPT | Performed by: FAMILY MEDICINE

## 2024-05-23 PROCEDURE — 0241U POCT CEPHEID COV-2, FLU A/B, RSV - PCR: CPT | Performed by: FAMILY MEDICINE

## 2024-05-23 PROCEDURE — 87651 STREP A DNA AMP PROBE: CPT | Performed by: FAMILY MEDICINE

## 2024-05-23 RX ORDER — VALACYCLOVIR HYDROCHLORIDE 500 MG/1
1 TABLET, FILM COATED ORAL
COMMUNITY

## 2024-05-23 RX ORDER — LEVOTHYROXINE SODIUM 0.05 MG/1
1 TABLET ORAL
COMMUNITY

## 2024-05-23 ASSESSMENT — ENCOUNTER SYMPTOMS
SORE THROAT: 1
SINUS PAIN: 1

## 2024-05-23 NOTE — PROGRESS NOTES
Subjective     Brenda Marie Campagni Milligan is a 49 y.o. female who presents with Sore Throat (X 2 days, sore throat, post nasal drip, requesting a strep test.)    - This is a very pleasant 49 y.o. who has come to the walk-in clinic today for 3 days with increased nasal pain pressure burning feeling and postnasal drip and sore throat.  No associated nausea vomiting fevers chills.  Coughs up some of the postnasal drip and it is colorful thick.    History several months ago and nasal cyst and had to have surgery.          ALLERGIES:  Fentanyl, Bupropion, Esomeprazole, Fluvoxamine, Lansoprazole, Midazolam, Nexium, Omeprazole, and Vortioxetine     PMH:  Past Medical History:   Diagnosis Date    Hypothyroidism     Patient denies medical problems         PSH:  History reviewed. No pertinent surgical history.    MEDS:    Current Outpatient Medications:     valACYclovir (VALTREX) 500 MG Tab, Take 1 Tablet by mouth every day., Disp: , Rfl:     LORazepam (ATIVAN) 0.5 MG Tab, TAKE 1 TABLET BY MOUTH EVERY 4 HOURS AS NEEDED. MAX 4/24 HOUR, Disp: , Rfl:     Omega-3 Fatty Acids (FISH OIL) 1000 MG Cap capsule, Take 1,000 mg by mouth every day., Disp: , Rfl:     Ascorbic Acid (VITAMIN C) 1000 MG Tab, Take  by mouth., Disp: , Rfl:     Cholecalciferol (VITAMIN D) 2000 UNIT Tab, Take  by mouth every day., Disp: , Rfl:     Magnesium 100 MG Cap, Take  by mouth every day., Disp: , Rfl:     triamterene-hctz (MAXZIDE-25/DYAZIDE) 37.5-25 MG Tab, triamterene 37.5 mg-hydrochlorothiazide 25 mg tablet  TAKE 1 TABLET BY MOUTH EVERY MORNING AS DIRECTED FOR SWELLING, Disp: , Rfl:     levothyroxine (SYNTHROID) 50 MCG Tab, Take 1 Tablet by mouth every day., Disp: , Rfl:     metoprolol SR (TOPROL XL) 25 MG TABLET SR 24 HR, Take 1 Tablet by mouth 1 time a day as needed (for sustained palpitations). Start with half tablet (12.5mg) once daily as needed for sustained palpitations. If tolerated, can increase to whole tablet (25mg) once daily as needed.  "(Patient not taking: Reported on 5/23/2024), Disp: 30 Tablet, Rfl: 0    TRI-BASIL 0.01-4-0.05 % Cream, APPLY TOPICALLY TO FACE EVERY NIGHT AT BEDTIME AS TOLERATED (Patient not taking: Reported on 5/23/2024), Disp: , Rfl:     fluvoxAMINE (LUVOX) 50 MG Tab, fluvoxamine 50 mg tablet  2 po in AM and 1 po in PM for MC (Patient not taking: Reported on 5/23/2024), Disp: , Rfl:     thyroid (ARMOUR THYROID) 120 MG Tab, Take 15 mg by mouth every day. 15mg everyday (Patient not taking: Reported on 5/23/2024), Disp: , Rfl:     ** I have documented what I find to be significant in regards to past medical, social, family and surgical history  in my HPI or under PMH/PSH/FH review section, otherwise it is noncontributory **           HPI    Review of Systems   HENT:  Positive for congestion, sinus pain and sore throat.    All other systems reviewed and are negative.             Objective     /58   Pulse 94   Temp 36.6 °C (97.8 °F) (Oral)   Resp 14   Ht 1.638 m (5' 4.5\")   Wt 58.1 kg (128 lb)   SpO2 98%   BMI 21.63 kg/m²      Physical Exam  Vitals and nursing note reviewed.   Constitutional:       General: She is not in acute distress.     Appearance: Normal appearance. She is well-developed.   HENT:      Head: Normocephalic.      Nose: Congestion and rhinorrhea present.      Mouth/Throat:      Mouth: Mucous membranes are moist.      Pharynx: Oropharynx is clear. Posterior oropharyngeal erythema present. No oropharyngeal exudate.   Cardiovascular:      Rate and Rhythm: Normal rate and regular rhythm.   Pulmonary:      Effort: Pulmonary effort is normal. No respiratory distress.      Breath sounds: Normal breath sounds.   Neurological:      Mental Status: She is alert.      Motor: No abnormal muscle tone.   Psychiatric:         Mood and Affect: Mood normal.         Behavior: Behavior normal.           Assessment & Plan     1. RTI (respiratory tract infection)  POCT GROUP A STREP, PCR    POCT CoV-2, Flu A/B, RSV by PCR    "   2. Pharyngitis due to other organism  POCT GROUP A STREP, PCR      3. PND (post-nasal drip)          Viral respiratory infection with sinusitis.  Just got a 2-week prescription of Augmentin from ENT so we will go ahead and start doing that will rule out COVID and strep and flu.    - Dx, plan & d/c instructions discussed   - Rest, stay hydrated, otc flonase        Follow up with your regular primary care providers office within a week to keep them updated and informed of this visit and for regular routine health maintenance check-ups. ER if not improving in 2-3 days or if feeling/getting worse. (If you do not have a primary care provider and need to schedule one you may call [facility] at [phone] to do this).    Patient left in stable condition       Discussed if any testing, labs or imaging studies are obtained outside of the [facility] facility, it is their responsibility to contact the Urgent Care and let us know that it was done and get us the results so adequate follow up can be initiated    Pertinent prior lab work and/or imaging studies in Epic have been reviewed by me today on day of this visit and taken into account for my treatment and plan today    Pertinent PMH/PSH and/or chronic conditions and medications if any were reviewed today and taken into account for my treatment and plan today    Pertinent prior office visit notes in [facility] have been reviewed by me today on day of this visit.    Please note that this dictation may have been created using voice recognition software, if so I have made every reasonable attempt to correct obvious errors, but I expect that there are errors of grammar and possibly content that I did not discover before finalizing the note.

## 2024-06-21 ENCOUNTER — HOSPITAL ENCOUNTER (OUTPATIENT)
Dept: LAB | Facility: MEDICAL CENTER | Age: 49
End: 2024-06-21
Attending: NURSE PRACTITIONER
Payer: COMMERCIAL

## 2024-06-21 LAB
EST. AVERAGE GLUCOSE BLD GHB EST-MCNC: 114 MG/DL
ESTRADIOL SERPL-MCNC: 24.7 PG/ML
FSH SERPL-ACNC: 57.5 MIU/ML
HBA1C MFR BLD: 5.6 % (ref 4–5.6)
LH SERPL-ACNC: 38.7 IU/L
PROGEST SERPL-MCNC: 2 NG/ML
T3FREE SERPL-MCNC: 2.49 PG/ML (ref 2–4.4)
T4 FREE SERPL-MCNC: 1.16 NG/DL (ref 0.93–1.7)
THYROPEROXIDASE AB SERPL-ACNC: <9 IU/ML (ref 0–9)
TSH SERPL DL<=0.005 MIU/L-ACNC: 0.54 UIU/ML (ref 0.38–5.33)

## 2024-06-21 PROCEDURE — 84443 ASSAY THYROID STIM HORMONE: CPT

## 2024-06-21 PROCEDURE — 83002 ASSAY OF GONADOTROPIN (LH): CPT

## 2024-06-21 PROCEDURE — 84144 ASSAY OF PROGESTERONE: CPT

## 2024-06-21 PROCEDURE — 86376 MICROSOMAL ANTIBODY EACH: CPT

## 2024-06-21 PROCEDURE — 84481 FREE ASSAY (FT-3): CPT

## 2024-06-21 PROCEDURE — 84439 ASSAY OF FREE THYROXINE: CPT

## 2024-06-21 PROCEDURE — 84305 ASSAY OF SOMATOMEDIN: CPT

## 2024-06-21 PROCEDURE — 36415 COLL VENOUS BLD VENIPUNCTURE: CPT

## 2024-06-21 PROCEDURE — 83036 HEMOGLOBIN GLYCOSYLATED A1C: CPT

## 2024-06-21 PROCEDURE — 84270 ASSAY OF SEX HORMONE GLOBUL: CPT

## 2024-06-21 PROCEDURE — 82670 ASSAY OF TOTAL ESTRADIOL: CPT

## 2024-06-21 PROCEDURE — 84402 ASSAY OF FREE TESTOSTERONE: CPT

## 2024-06-21 PROCEDURE — 84403 ASSAY OF TOTAL TESTOSTERONE: CPT

## 2024-06-21 PROCEDURE — 83001 ASSAY OF GONADOTROPIN (FSH): CPT

## 2024-06-21 PROCEDURE — 84482 T3 REVERSE: CPT

## 2024-06-23 LAB
IGF-I SERPL-MCNC: 178 NG/ML (ref 57–236)
IGF-I Z-SCORE SERPL: 1

## 2024-06-26 LAB — T3REVERSE SERPL-MCNC: 14.1 NG/DL (ref 9–27)

## 2024-06-28 LAB
SHBG SERPL-SCNC: 105 NMOL/L (ref 17–125)
TESTOST FREE SERPL-MCNC: 4.8 PG/ML (ref 1.1–5.8)
TESTOST SERPL-MCNC: 63 NG/DL (ref 9–55)

## 2024-07-10 ENCOUNTER — HOSPITAL ENCOUNTER (OUTPATIENT)
Dept: LAB | Facility: MEDICAL CENTER | Age: 49
End: 2024-07-10
Attending: FAMILY MEDICINE
Payer: COMMERCIAL

## 2024-07-10 LAB
ALBUMIN SERPL BCP-MCNC: 4.4 G/DL (ref 3.2–4.9)
ALBUMIN/GLOB SERPL: 1.6 G/DL
ALP SERPL-CCNC: 54 U/L (ref 30–99)
ALT SERPL-CCNC: 27 U/L (ref 2–50)
ANION GAP SERPL CALC-SCNC: 12 MMOL/L (ref 7–16)
AST SERPL-CCNC: 25 U/L (ref 12–45)
BILIRUB SERPL-MCNC: 0.6 MG/DL (ref 0.1–1.5)
BUN SERPL-MCNC: 17 MG/DL (ref 8–22)
CALCIUM ALBUM COR SERPL-MCNC: 8.6 MG/DL (ref 8.5–10.5)
CALCIUM SERPL-MCNC: 8.9 MG/DL (ref 8.5–10.5)
CHLORIDE SERPL-SCNC: 105 MMOL/L (ref 96–112)
CO2 SERPL-SCNC: 25 MMOL/L (ref 20–33)
CREAT SERPL-MCNC: 0.64 MG/DL (ref 0.5–1.4)
GFR SERPLBLD CREATININE-BSD FMLA CKD-EPI: 107 ML/MIN/1.73 M 2
GLOBULIN SER CALC-MCNC: 2.8 G/DL (ref 1.9–3.5)
GLUCOSE SERPL-MCNC: 87 MG/DL (ref 65–99)
POTASSIUM SERPL-SCNC: 4 MMOL/L (ref 3.6–5.5)
PROT SERPL-MCNC: 7.2 G/DL (ref 6–8.2)
SODIUM SERPL-SCNC: 142 MMOL/L (ref 135–145)

## 2024-07-10 PROCEDURE — 80053 COMPREHEN METABOLIC PANEL: CPT

## 2024-07-10 PROCEDURE — 83525 ASSAY OF INSULIN: CPT

## 2024-07-10 PROCEDURE — 36415 COLL VENOUS BLD VENIPUNCTURE: CPT

## 2024-07-12 LAB — INSULIN P FAST SERPL-ACNC: 6 UIU/ML (ref 3–25)

## 2024-10-20 ENCOUNTER — OFFICE VISIT (OUTPATIENT)
Dept: URGENT CARE | Facility: CLINIC | Age: 49
End: 2024-10-20
Payer: COMMERCIAL

## 2024-10-20 VITALS
DIASTOLIC BLOOD PRESSURE: 74 MMHG | RESPIRATION RATE: 19 BRPM | TEMPERATURE: 99.1 F | BODY MASS INDEX: 21.68 KG/M2 | HEART RATE: 79 BPM | OXYGEN SATURATION: 96 % | WEIGHT: 127 LBS | SYSTOLIC BLOOD PRESSURE: 114 MMHG | HEIGHT: 64 IN

## 2024-10-20 DIAGNOSIS — J02.9 SORE THROAT: ICD-10-CM

## 2024-10-20 LAB — S PYO DNA SPEC NAA+PROBE: NOT DETECTED

## 2024-10-20 PROCEDURE — 3078F DIAST BP <80 MM HG: CPT | Performed by: PHYSICIAN ASSISTANT

## 2024-10-20 PROCEDURE — 3074F SYST BP LT 130 MM HG: CPT | Performed by: PHYSICIAN ASSISTANT

## 2024-10-20 PROCEDURE — 99213 OFFICE O/P EST LOW 20 MIN: CPT | Performed by: PHYSICIAN ASSISTANT

## 2024-10-20 PROCEDURE — 87651 STREP A DNA AMP PROBE: CPT | Performed by: PHYSICIAN ASSISTANT

## 2024-10-20 ASSESSMENT — ENCOUNTER SYMPTOMS
MYALGIAS: 0
HEADACHES: 1
SHORTNESS OF BREATH: 0
FEVER: 0
TROUBLE SWALLOWING: 0
DIARRHEA: 0
EYE REDNESS: 0
COUGH: 0
VOMITING: 0
SORE THROAT: 1
NAUSEA: 0
EYE DISCHARGE: 0